# Patient Record
Sex: MALE
[De-identification: names, ages, dates, MRNs, and addresses within clinical notes are randomized per-mention and may not be internally consistent; named-entity substitution may affect disease eponyms.]

---

## 2018-06-08 ENCOUNTER — HOSPITAL ENCOUNTER (EMERGENCY)
Dept: HOSPITAL 42 - ED | Age: 23
Discharge: HOME | End: 2018-06-08
Payer: MEDICAID

## 2018-06-08 VITALS
SYSTOLIC BLOOD PRESSURE: 126 MMHG | HEART RATE: 78 BPM | TEMPERATURE: 98.6 F | DIASTOLIC BLOOD PRESSURE: 66 MMHG | OXYGEN SATURATION: 98 %

## 2018-06-08 VITALS — RESPIRATION RATE: 18 BRPM

## 2018-06-08 VITALS — BODY MASS INDEX: 25.8 KG/M2

## 2018-06-08 DIAGNOSIS — N50.3: ICD-10-CM

## 2018-06-08 DIAGNOSIS — N43.3: Primary | ICD-10-CM

## 2018-06-08 LAB
ALBUMIN SERPL-MCNC: 4.8 G/DL (ref 3–4.8)
ALBUMIN/GLOB SERPL: 1.5 {RATIO} (ref 1.1–1.8)
ALT SERPL-CCNC: 26 U/L (ref 7–56)
APPEARANCE UR: CLEAR
AST SERPL-CCNC: 24 U/L (ref 17–59)
BASOPHILS # BLD AUTO: 0.04 K/MM3 (ref 0–2)
BASOPHILS NFR BLD: 0.8 % (ref 0–3)
BILIRUB UR-MCNC: NEGATIVE MG/DL
BUN SERPL-MCNC: 13 MG/DL (ref 7–21)
CALCIUM SERPL-MCNC: 9.8 MG/DL (ref 8.4–10.5)
COLOR UR: YELLOW
EOSINOPHIL # BLD: 0.1 10*3/UL (ref 0–0.7)
EOSINOPHIL NFR BLD: 2.5 % (ref 1.5–5)
ERYTHROCYTE [DISTWIDTH] IN BLOOD BY AUTOMATED COUNT: 12.3 % (ref 11.5–14.5)
GFR NON-AFRICAN AMERICAN: > 60
GLUCOSE UR STRIP-MCNC: NEGATIVE MG/DL
GRANULOCYTES # BLD: 2.22 10*3/UL (ref 1.4–6.5)
GRANULOCYTES NFR BLD: 43.3 % (ref 50–68)
HGB BLD-MCNC: 15.3 G/DL (ref 14–18)
LEUKOCYTE ESTERASE UR-ACNC: NEGATIVE LEU/UL
LYMPHOCYTES # BLD: 2.2 10*3/UL (ref 1.2–3.4)
LYMPHOCYTES NFR BLD AUTO: 43.2 % (ref 22–35)
MCH RBC QN AUTO: 31.2 PG (ref 25–35)
MCHC RBC AUTO-ENTMCNC: 34.8 G/DL (ref 31–37)
MCV RBC AUTO: 89.8 FL (ref 80–105)
MONOCYTES # BLD AUTO: 0.5 10*3/UL (ref 0.1–0.6)
MONOCYTES NFR BLD: 10.2 % (ref 1–6)
PH UR STRIP: 6 [PH] (ref 4.7–8)
PLATELET # BLD: 280 10^3/UL (ref 120–450)
PMV BLD AUTO: 8.9 FL (ref 7–11)
PROT UR STRIP-MCNC: NEGATIVE MG/DL
RBC # BLD AUTO: 4.9 10^6/UL (ref 3.5–6.1)
RBC # UR STRIP: NEGATIVE /UL
SP GR UR STRIP: 1.01 (ref 1–1.03)
UROBILINOGEN UR STRIP-ACNC: 1 E.U./DL
WBC # BLD AUTO: 5.1 10^3/UL (ref 4.5–11)

## 2018-06-08 PROCEDURE — 80053 COMPREHEN METABOLIC PANEL: CPT

## 2018-06-08 PROCEDURE — 99284 EMERGENCY DEPT VISIT MOD MDM: CPT

## 2018-06-08 PROCEDURE — 96374 THER/PROPH/DIAG INJ IV PUSH: CPT

## 2018-06-08 PROCEDURE — 81003 URINALYSIS AUTO W/O SCOPE: CPT

## 2018-06-08 PROCEDURE — 93975 VASCULAR STUDY: CPT

## 2018-06-08 PROCEDURE — 85025 COMPLETE CBC W/AUTO DIFF WBC: CPT

## 2018-06-08 NOTE — ED PDOC
Arrival/HPI





- General


Chief Complaint: Male Genitourinary


Time Seen by Provider: 06/08/18 14:31


Historian: Patient





- History of Present Illness


Narrative History of Present Illness (Text): 





06/08/18 14:36


22 y/o male, no significant pmh, nkda, c/o lt. testicular pain on and off x 3 

months which he never see an urologist with no fall or trauma.  Pt. stated that 

he has pain, 5/10, no urinary symptoms, no rash, no abdominal pain, no other 

medical or psychological complaints. 





Past Medical History





- Provider Review


Nursing Documentation Reviewed: Yes





- Infectious Disease


Hx of Infectious Diseases: None





- Psychiatric


Hx Substance Use: No





Family/Social History





- Physician Review


Nursing Documentation Reviewed: Yes


Family/Social History: Unknown Family HX


Smoking Status: Never Smoked


Hx Alcohol Use: No


Hx Substance Use: No





Allergies/Home Meds


Allergies/Adverse Reactions: 


Allergies





No Known Allergies Allergy (Verified 06/08/18 14:24)


 











Review of Systems





- Review of Systems


Constitutional: absent: Fatigue, Fevers


Eyes: absent: Vision Changes


ENT: absent: Hearing Changes


Respiratory: absent: SOB, Cough


Cardiovascular: absent: Chest Pain


Gastrointestinal: absent: Abdominal Pain, Nausea, Vomiting


Genitourinary Male: Other (lt. testicular pain).  absent: Dysuria, Frequency, 

Hematuria, Urinary Output Changes


Musculoskeletal: absent: Arthralgias, Back Pain


Skin: absent: Rash, Pruritis





Physical Exam


Vital Signs Reviewed: Yes


Vital Signs











  Temp Pulse Resp BP Pulse Ox


 


 06/08/18 14:04  99.2 F  88  18  142/54 L  96











Temperature: Afebrile


Blood Pressure: Hypertensive


Pulse: Regular


Respiratory Rate: Normal


Appearance: Positive for: Well-Appearing, Non-Toxic, Comfortable


Pain Distress: Moderate


Mental Status: Positive for: Alert and Oriented X 3





- Systems Exam


Head: Present: Atraumatic, Normocephalic


Pupils: Present: PERRL


Extroacular Muscles: Present: EOMI


Conjunctiva: Present: Normal


Mouth: Present: Moist Mucous Membranes


Neck: Present: Normal Range of Motion


Respiratory/Chest: Present: Clear to Auscultation, Good Air Exchange.  No: 

Respiratory Distress, Accessory Muscle Use


Cardiovascular: Present: Regular Rate and Rhythm, Normal S1, S2.  No: Murmurs


Abdomen: No: Tenderness, Distention, Peritoneal Signs


Genitourinary Male: Present: Normal External Genitalia, Circumcised Penis, 

Other (no testicular abscess or scrotal abscess).  No: Lesions, Penile Discharge

, Testicle Tenderness, Penile Swelling, Masses, Erythema, Hernias, Testicle 

Swelling


Back: Present: Normal Inspection


Upper Extremity: Present: Normal Inspection.  No: Cyanosis, Edema


Lower Extremity: Present: Normal Inspection.  No: Edema


Neurological: Present: GCS=15, CN II-XII Intact, Speech Normal, Motor Func 

Grossly Intact, Gait Normal, Memory Normal


Skin: Present: Warm, Dry, Normal Color.  No: Rashes


Psychiatric: Present: Alert, Oriented x 3, Normal Insight, Normal Concentration





Medical Decision Making


ED Course and Treatment: 





06/08/18 14:40


-labs/ua


-testicular sonogram


-IV toradol


-Observe and reassess








06/08/18 18:26


-Testicular sonogram show Right-sided hydrocele. Both testicles exhibit 

arterial flow.  Small left-sided epididymal cyst-spermatocele.


-Labs show no acute findings


-UA show no UTI


-Pt. stated that he has no urinary symptoms, stated that this is not STD and 

refused STD testing, refused prophylatic treatment as he only have one single 

partner and been tested for STI which is negative couple months ago. 


-Pt. feels much better, request to be discharged home.


-Discharge home with motrin, bed rest, avoid strenuous exercise or activity, 

follow up with your own pmd and urologist within 2 days, return to the ER for 

any new or worsening signs or symptoms. 








- Lab Interpretations


Lab Results: 








 06/08/18 15:14 





 06/08/18 15:14 





 Lab Results





06/08/18 17:35: Urine Color Yellow, Urine Appearance Clear, Urine pH 6.0, Ur 

Specific Gravity 1.010, Urine Protein Negative, Urine Glucose (UA) Negative, 

Urine Ketones 15 H, Urine Blood Negative, Urine Nitrate Negative, Urine 

Bilirubin Negative, Urine Urobilinogen 1.0 H, Ur Leukocyte Esterase Negative


06/08/18 15:14: WBC 5.1, RBC 4.90, Hgb 15.3, Hct 44.0, MCV 89.8, MCH 31.2, MCHC 

34.8, RDW 12.3, Plt Count 280, MPV 8.9, Gran % 43.3 L, Lymph % (Auto) 43.2 H, 

Mono % (Auto) 10.2 H, Eos % (Auto) 2.5, Baso % (Auto) 0.8, Gran # 2.22, Lymph # 

(Auto) 2.2, Mono # (Auto) 0.5, Eos # (Auto) 0.1, Baso # (Auto) 0.04


06/08/18 15:14: Sodium 145, Potassium 4.2, Chloride 103, Carbon Dioxide 28, 

Anion Gap 19, BUN 13, Creatinine 0.8, Est GFR (African Amer) > 60, Est GFR (Non-

Af Amer) > 60, Random Glucose 94, Calcium 9.8, Total Bilirubin 1.0, AST 24, ALT 

26, Alkaline Phosphatase 65, Total Protein 8.2, Albumin 4.8, Globulin 3.3, 

Albumin/Globulin Ratio 1.5











- RAD Interpretation


Radiology Orders: 








06/08/18 14:31


TESTES DUPLEX COMPLETE [US] Stat 








HISTORY:


Chronic testicular pain for the past few months 





TECHNIQUE:


Realtime sonography through the scrotum with color and doppler flow.





COMPARISON:


None Available.





FINDINGS:





RIGHT TESTICLE:


Measures 4.5 x 2.3 x 2.8 cm. Normal echotexture and flow.





RIGHT EPIDIDYMIS:


Epididymal head measures 8.5 mm x 10.4 mm x 9.0 mm . Grossly unremarkable 

appearance with normal flow.





LEFT TESTICLE:


Measures 4.4 x 2.4 x 2.9 cm. Normal echotexture and flow.





LEFT EPIDIDYMIS:


Epididymal head measures 7 mm x 10.8 mm x 5.9 mm. .  There is a small 

epididymal cyst or spermatocele measuring 3 mm x 2 mm x 2.8 mm. With normal 

flow.





HYDROCELE:


Right-sided hydrocele.





VARICOCELE:


None.





OTHER FINDINGS:


None. 





IMPRESSION:


Right-sided hydrocele. Both testicles exhibit arterial flow. 





Small left-sided epididymal cyst-spermatocele.





: Radiologist





- Medication Orders


Current Medication Orders: 











Discontinued Medications





Ketorolac Tromethamine (Toradol)  30 mg IVP STAT STA


   Stop: 06/08/18 14:33


   Last Admin: 06/08/18 15:03  Dose: 30 mg





MAR Pain Assessment


 Document     06/08/18 15:03  SRE  (Rec: 06/08/18 15:04  SRE  [id number])


     Pain Reassessment


      Is this a pain reassessment?               Yes


     Sleep


      Is patient sleeping during reassessment?   No


     Presence of Pain


      Presence of Pain                           Yes


     Pain Scale Used


      Pain Scale Used                            Numeric


     Location


      Left, Right or Bilateral                   Left


      Pain Location Body Site                    Generalized


     Description


      Description                                Intermittent


IVP Administration


 Document     06/08/18 15:03  SRE  (Rec: 06/08/18 15:04  SRE  [id number])


     Charges for Administration


      # of IVP Administrations                   1





Oxycodone/Acetaminophen (Percocet 5/325 Mg Tab)  1 tab PO STAT STA


   Stop: 06/08/18 14:33


   Last Admin: 06/08/18 15:04  Dose: 1 tab





MAR Pain Assessment


 Document     06/08/18 15:04  SRE  (Rec: 06/08/18 15:04  SRE  [id number])


     Pain Reassessment


      Is this a pain reassessment?               Yes


     Sleep


      Is patient sleeping during reassessment?   No


     Presence of Pain


      Presence of Pain                           No


     Pain Scale Used


      Pain Scale Used                            Numeric


     Location


      Left, Right or Bilateral                   Left














- PA / NP / Resident Statement


MD/DO has reviewed & agrees with the documentation as recorded.





Disposition/Present on Arrival





- Present on Arrival


Any Indicators Present on Arrival: No


History of DVT/PE: No


History of Uncontrolled Diabetes: No


Urinary Catheter: No


History of Decub. Ulcer: No


History Surgical Site Infection Following: None





- Disposition


Have Diagnosis and Disposition been Completed?: Yes


Diagnosis: 


 Hydrocele, Cyst of epididymis





Disposition: HOME/ ROUTINE


Disposition Time: 14:40


Patient Plan: Discharge


Condition: IMPROVED


Discharge Instructions (ExitCare):  Hydrocele/Varicocele (DC)


Additional Instructions: 


-Discharge home with motrin, bed rest, avoid strenuous exercise or activity, 

follow up with your own pmd and urologist within 2 days, return to the ER for 

any new or worsening signs or symptoms. 


Prescriptions: 


Ibuprofen [Motrin Tab] 600 mg PO QID PRN #30 tab


 PRN Reason: pain


Referrals: 


Arabella Casarez, [Primary Care Provider] - Follow up with primary


Feroz Fields MD [Staff Provider] - Follow up with primary


Forms:  WORK NOTE

## 2018-06-08 NOTE — US
HISTORY:

Chronic testicular pain for the past few months 



TECHNIQUE:

Realtime sonography through the scrotum with color and doppler flow.



COMPARISON:

None Available.



FINDINGS:



RIGHT TESTICLE:

Measures 4.5 x 2.3 x 2.8 cm. Normal echotexture and flow.



RIGHT EPIDIDYMIS:

Epididymal head measures 8.5 mm x 10.4 mm x 9.0 mm . Grossly 

unremarkable appearance with normal flow.



LEFT TESTICLE:

Measures 4.4 x 2.4 x 2.9 cm. Normal echotexture and flow.



LEFT EPIDIDYMIS:

Epididymal head measures 7 mm x 10.8 mm x 5.9 mm. .  There is a small 

epididymal cyst or spermatocele measuring 3 mm x 2 mm x 2.8 mm. With 

normal flow.



HYDROCELE:

Right-sided hydrocele.



VARICOCELE:

None.



OTHER FINDINGS:

None. 



IMPRESSION:

Right-sided hydrocele. Both testicles exhibit arterial flow. 



Small left-sided epididymal cyst-spermatocele.